# Patient Record
Sex: MALE | Race: WHITE | NOT HISPANIC OR LATINO | Employment: FULL TIME | URBAN - METROPOLITAN AREA
[De-identification: names, ages, dates, MRNs, and addresses within clinical notes are randomized per-mention and may not be internally consistent; named-entity substitution may affect disease eponyms.]

---

## 2022-09-23 ENCOUNTER — OFFICE VISIT (OUTPATIENT)
Dept: URGENT CARE | Facility: CLINIC | Age: 32
End: 2022-09-23
Payer: COMMERCIAL

## 2022-09-23 VITALS
HEIGHT: 72 IN | HEART RATE: 82 BPM | DIASTOLIC BLOOD PRESSURE: 67 MMHG | OXYGEN SATURATION: 97 % | BODY MASS INDEX: 26.68 KG/M2 | WEIGHT: 197 LBS | TEMPERATURE: 99 F | RESPIRATION RATE: 18 BRPM | SYSTOLIC BLOOD PRESSURE: 116 MMHG

## 2022-09-23 DIAGNOSIS — L42 PITYRIASIS ROSEA: Primary | ICD-10-CM

## 2022-09-23 PROCEDURE — 99203 OFFICE O/P NEW LOW 30 MIN: CPT | Mod: ,,, | Performed by: FAMILY MEDICINE

## 2022-09-23 PROCEDURE — 99203 PR OFFICE/OUTPT VISIT, NEW, LEVL III, 30-44 MIN: ICD-10-PCS | Mod: ,,, | Performed by: FAMILY MEDICINE

## 2022-09-23 RX ORDER — HYDROXYZINE HYDROCHLORIDE 25 MG/1
25 TABLET, FILM COATED ORAL 4 TIMES DAILY PRN
Qty: 20 TABLET | Refills: 0 | Status: SHIPPED | OUTPATIENT
Start: 2022-09-23 | End: 2022-09-28

## 2022-09-23 NOTE — PATIENT INSTRUCTIONS
Medications sent to pharmacy  May cause drowsiness.  Do not drink alcohol or drive when taking it  Rash is self-limiting and will resolve on its own after few weeks  Contact this clinic with any concerns or changes in symptoms

## 2022-09-23 NOTE — PROGRESS NOTES
Subjective:       Patient ID: Rad Bowman is a 32 y.o. male.    Vitals:  height is 6' (1.829 m) and weight is 89.4 kg (197 lb). His temperature is 98.5 °F (36.9 °C). His blood pressure is 116/67 and his pulse is 82. His respiration is 18 and oxygen saturation is 97%.     Chief Complaint: Rash    32 y.o. male presents to clinic today w/ c/o itchy rash on abdomen and both arms x1wk. No alleviating factors used. Denies fever, red streaking, drainage, change in soaps/detergents, change in skin products, change in diet.  Patient states the rash began as 1 large patch on the left lower abdomen and then began to spread    Rash    Constitution: Negative.   HENT: Negative.     Neck: neck negative.   Cardiovascular: Negative.    Eyes: Negative.    Respiratory: Negative.     Gastrointestinal: Negative.    Genitourinary: Negative.    Musculoskeletal: Negative.    Skin:  Positive for rash.   Allergic/Immunologic: Negative.    Neurological: Negative.    Hematologic/Lymphatic: Negative.      Objective:      Physical Exam   Constitutional: He is oriented to person, place, and time.  Non-toxic appearance. He does not appear ill. No distress. normal  HENT:   Head: Normocephalic and atraumatic.   Eyes: Conjunctivae are normal.   Pulmonary/Chest: Effort normal.   Abdominal: Normal appearance.   Neurological: He is alert and oriented to person, place, and time.   Skin: Skin is rash (erythemic raised oval and annular lesions on the upper extremities and torso).   Psychiatric: His behavior is normal. Mood, judgment and thought content normal.          Previous History      Review of patient's allergies indicates:   Allergen Reactions    Azithromycin        Past Medical History:   Diagnosis Date    Ulcerative (chronic) proctitis      Current Outpatient Medications   Medication Instructions    hydrOXYzine HCL (ATARAX) 25 mg, Oral, 4 times daily PRN     Past Surgical History:   Procedure Laterality Date    ANKLE SURGERY       Family  History   Problem Relation Age of Onset    No Known Problems Mother     No Known Problems Father     No Known Problems Sister     No Known Problems Brother        Social History     Tobacco Use    Smoking status: Never    Smokeless tobacco: Never   Substance Use Topics    Alcohol use: Not Currently    Drug use: Never        Physical Exam      Vital Signs Reviewed   /67   Pulse 82   Temp 98.5 °F (36.9 °C)   Resp 18   Ht 6' (1.829 m)   Wt 89.4 kg (197 lb)   SpO2 97%   BMI 26.72 kg/m²        Procedures    Procedures     Labs   No results found for this or any previous visit.    Assessment:       1. Pityriasis rosea          Plan:       Medications sent to pharmacy  May cause drowsiness.  Do not drink alcohol or drive when taking it  Rash is self-limiting and will resolve on its own after few weeks  Contact this clinic with any concerns or changes in symptoms  Pityriasis rosea    Other orders  -     hydrOXYzine HCL (ATARAX) 25 MG tablet; Take 1 tablet (25 mg total) by mouth 4 (four) times daily as needed for Itching.  Dispense: 20 tablet; Refill: 0